# Patient Record
Sex: FEMALE | Race: WHITE | NOT HISPANIC OR LATINO | URBAN - METROPOLITAN AREA
[De-identification: names, ages, dates, MRNs, and addresses within clinical notes are randomized per-mention and may not be internally consistent; named-entity substitution may affect disease eponyms.]

---

## 2017-01-25 ENCOUNTER — INPATIENT (INPATIENT)
Facility: HOSPITAL | Age: 62
LOS: 1 days | Discharge: HOME | DRG: 192 | End: 2017-01-27
Attending: INTERNAL MEDICINE | Admitting: INTERNAL MEDICINE
Payer: COMMERCIAL

## 2017-01-25 VITALS
HEART RATE: 85 BPM | TEMPERATURE: 98 F | DIASTOLIC BLOOD PRESSURE: 71 MMHG | RESPIRATION RATE: 19 BRPM | SYSTOLIC BLOOD PRESSURE: 147 MMHG | OXYGEN SATURATION: 94 %

## 2017-01-25 DIAGNOSIS — J18.9 PNEUMONIA, UNSPECIFIED ORGANISM: ICD-10-CM

## 2017-01-25 DIAGNOSIS — J44.9 CHRONIC OBSTRUCTIVE PULMONARY DISEASE, UNSPECIFIED: ICD-10-CM

## 2017-01-25 LAB
ALBUMIN SERPL ELPH-MCNC: 4.3 G/DL — SIGNIFICANT CHANGE UP (ref 3.3–5)
ALP SERPL-CCNC: 119 U/L — SIGNIFICANT CHANGE UP (ref 40–120)
ALT FLD-CCNC: 41 U/L RC — SIGNIFICANT CHANGE UP (ref 10–45)
ANION GAP SERPL CALC-SCNC: 19 MMOL/L — HIGH (ref 5–17)
APPEARANCE UR: CLEAR — SIGNIFICANT CHANGE UP
AST SERPL-CCNC: 36 U/L — SIGNIFICANT CHANGE UP (ref 10–40)
BASE EXCESS BLDV CALC-SCNC: 2.2 MMOL/L — HIGH (ref -2–2)
BASOPHILS # BLD AUTO: 0 K/UL — SIGNIFICANT CHANGE UP (ref 0–0.2)
BASOPHILS NFR BLD AUTO: 0.3 % — SIGNIFICANT CHANGE UP (ref 0–2)
BILIRUB SERPL-MCNC: 0.5 MG/DL — SIGNIFICANT CHANGE UP (ref 0.2–1.2)
BILIRUB UR-MCNC: NEGATIVE — SIGNIFICANT CHANGE UP
BUN SERPL-MCNC: 7 MG/DL — SIGNIFICANT CHANGE UP (ref 7–23)
CA-I SERPL-SCNC: 1.15 MMOL/L — SIGNIFICANT CHANGE UP (ref 1.12–1.3)
CALCIUM SERPL-MCNC: 9.3 MG/DL — SIGNIFICANT CHANGE UP (ref 8.4–10.5)
CHLORIDE BLDV-SCNC: 105 MMOL/L — SIGNIFICANT CHANGE UP (ref 96–108)
CHLORIDE SERPL-SCNC: 98 MMOL/L — SIGNIFICANT CHANGE UP (ref 96–108)
CO2 BLDV-SCNC: 28 MMOL/L — SIGNIFICANT CHANGE UP (ref 22–30)
CO2 SERPL-SCNC: 21 MMOL/L — LOW (ref 22–31)
COLOR SPEC: SIGNIFICANT CHANGE UP
CREAT SERPL-MCNC: 0.62 MG/DL — SIGNIFICANT CHANGE UP (ref 0.5–1.3)
D DIMER BLD IA.RAPID-MCNC: 150 NG/ML DDU — SIGNIFICANT CHANGE UP
DIFF PNL FLD: NEGATIVE — SIGNIFICANT CHANGE UP
EOSINOPHIL # BLD AUTO: 0.1 K/UL — SIGNIFICANT CHANGE UP (ref 0–0.5)
EOSINOPHIL NFR BLD AUTO: 1.1 % — SIGNIFICANT CHANGE UP (ref 0–6)
GAS PNL BLDV: 136 MMOL/L — SIGNIFICANT CHANGE UP (ref 136–145)
GAS PNL BLDV: SIGNIFICANT CHANGE UP
GAS PNL BLDV: SIGNIFICANT CHANGE UP
GLUCOSE BLDV-MCNC: 138 MG/DL — HIGH (ref 70–99)
GLUCOSE SERPL-MCNC: 134 MG/DL — HIGH (ref 70–99)
GLUCOSE UR QL: NEGATIVE — SIGNIFICANT CHANGE UP
HCO3 BLDV-SCNC: 26 MMOL/L — SIGNIFICANT CHANGE UP (ref 21–29)
HCOV OC43 RNA SPEC QL NAA+PROBE: DETECTED
HCT VFR BLD CALC: 44.8 % — SIGNIFICANT CHANGE UP (ref 34.5–45)
HCT VFR BLDA CALC: 47 % — SIGNIFICANT CHANGE UP (ref 39–50)
HGB BLD CALC-MCNC: 15.5 G/DL — SIGNIFICANT CHANGE UP (ref 11.5–15.5)
HGB BLD-MCNC: 15.4 G/DL — SIGNIFICANT CHANGE UP (ref 11.5–15.5)
HMPV RNA SPEC QL NAA+PROBE: DETECTED
KETONES UR-MCNC: NEGATIVE — SIGNIFICANT CHANGE UP
LACTATE BLDV-MCNC: 1 MMOL/L — SIGNIFICANT CHANGE UP (ref 0.7–2)
LEUKOCYTE ESTERASE UR-ACNC: NEGATIVE — SIGNIFICANT CHANGE UP
LYMPHOCYTES # BLD AUTO: 1 K/UL — SIGNIFICANT CHANGE UP (ref 1–3.3)
LYMPHOCYTES # BLD AUTO: 13.5 % — SIGNIFICANT CHANGE UP (ref 13–44)
MCHC RBC-ENTMCNC: 31.5 PG — SIGNIFICANT CHANGE UP (ref 27–34)
MCHC RBC-ENTMCNC: 34.4 GM/DL — SIGNIFICANT CHANGE UP (ref 32–36)
MCV RBC AUTO: 91.5 FL — SIGNIFICANT CHANGE UP (ref 80–100)
MONOCYTES # BLD AUTO: 0.8 K/UL — SIGNIFICANT CHANGE UP (ref 0–0.9)
MONOCYTES NFR BLD AUTO: 10.3 % — SIGNIFICANT CHANGE UP (ref 2–14)
NEUTROPHILS # BLD AUTO: 5.7 K/UL — SIGNIFICANT CHANGE UP (ref 1.8–7.4)
NEUTROPHILS NFR BLD AUTO: 74.9 % — SIGNIFICANT CHANGE UP (ref 43–77)
NITRITE UR-MCNC: NEGATIVE — SIGNIFICANT CHANGE UP
PCO2 BLDV: 42 MMHG — SIGNIFICANT CHANGE UP (ref 35–50)
PH BLDV: 7.42 — SIGNIFICANT CHANGE UP (ref 7.35–7.45)
PH UR: 6.5 — SIGNIFICANT CHANGE UP (ref 4.8–8)
PLATELET # BLD AUTO: 198 K/UL — SIGNIFICANT CHANGE UP (ref 150–400)
PO2 BLDV: 28 MMHG — SIGNIFICANT CHANGE UP (ref 25–45)
POTASSIUM BLDV-SCNC: 3.7 MMOL/L — SIGNIFICANT CHANGE UP (ref 3.5–5)
POTASSIUM SERPL-MCNC: 3.9 MMOL/L — SIGNIFICANT CHANGE UP (ref 3.5–5.3)
POTASSIUM SERPL-SCNC: 3.9 MMOL/L — SIGNIFICANT CHANGE UP (ref 3.5–5.3)
PROT SERPL-MCNC: 7.8 G/DL — SIGNIFICANT CHANGE UP (ref 6–8.3)
PROT UR-MCNC: NEGATIVE — SIGNIFICANT CHANGE UP
RAPID RVP RESULT: DETECTED
RBC # BLD: 4.9 M/UL — SIGNIFICANT CHANGE UP (ref 3.8–5.2)
RBC # FLD: 12.3 % — SIGNIFICANT CHANGE UP (ref 10.3–14.5)
SAO2 % BLDV: 47 % — LOW (ref 67–88)
SODIUM SERPL-SCNC: 138 MMOL/L — SIGNIFICANT CHANGE UP (ref 135–145)
SP GR SPEC: <1.005 — LOW (ref 1.01–1.02)
UROBILINOGEN FLD QL: NEGATIVE — SIGNIFICANT CHANGE UP
WBC # BLD: 7.6 K/UL — SIGNIFICANT CHANGE UP (ref 3.8–10.5)
WBC # FLD AUTO: 7.6 K/UL — SIGNIFICANT CHANGE UP (ref 3.8–10.5)

## 2017-01-25 PROCEDURE — 71020: CPT | Mod: 26

## 2017-01-25 PROCEDURE — 99285 EMERGENCY DEPT VISIT HI MDM: CPT

## 2017-01-25 RX ORDER — IPRATROPIUM/ALBUTEROL SULFATE 18-103MCG
3 AEROSOL WITH ADAPTER (GRAM) INHALATION EVERY 6 HOURS
Qty: 0 | Refills: 0 | Status: DISCONTINUED | OUTPATIENT
Start: 2017-01-25 | End: 2017-01-26

## 2017-01-25 RX ORDER — ENOXAPARIN SODIUM 100 MG/ML
40 INJECTION SUBCUTANEOUS DAILY
Qty: 0 | Refills: 0 | Status: DISCONTINUED | OUTPATIENT
Start: 2017-01-25 | End: 2017-01-26

## 2017-01-25 RX ORDER — IPRATROPIUM/ALBUTEROL SULFATE 18-103MCG
3 AEROSOL WITH ADAPTER (GRAM) INHALATION ONCE
Qty: 0 | Refills: 0 | Status: COMPLETED | OUTPATIENT
Start: 2017-01-25 | End: 2017-01-25

## 2017-01-25 RX ADMIN — Medication 3 MILLILITER(S): at 16:59

## 2017-01-25 RX ADMIN — Medication 125 MILLIGRAM(S): at 16:59

## 2017-01-25 NOTE — ED PROVIDER NOTE - MEDICAL DECISION MAKING DETAILS
Tom: 61 year old female with sob x 4 days, worsening, worse with exertion, no cp. + sick contacts. recent travel from [place]. history of copd. will get labs, cxr, duonebs, iv steroids, reassess. likely admit for copd exacerbation versus pna. will send d-dimer as patient is low risk only with travel.  Patient beccomes hypoxic with ambulation to 88%.

## 2017-01-25 NOTE — ED ADULT NURSE NOTE - OBJECTIVE STATEMENT
62 y/o F pt w/ pmhx COPD/emphysema, present to ED cough, sinus congestion and shortness of breath significantly worsening 4 days Pt reports that for the last 2 days SOB worsen with exertion, unable to walk 2 steps, breathing through pursed lips. She reports that she recently traveled to [place], as that is where she lives, which was two weeks ago. productive cough with greenish sputum. Pt went to urgent care today and she was walked with a pulse ox on, showing that she was desaturating to 86-87% on RA while walking and therefore was sent to the ER. She denies any fevers, chills, chest pain, leg swelling.

## 2017-01-25 NOTE — ED PROVIDER NOTE - CONSTITUTIONAL, MLM
normal... Well appearing, well nourished, awake, alert, oriented to person, place, time/situation and mild apparent distress secondary to shortness of breath

## 2017-01-25 NOTE — H&P ADULT. - HISTORY OF PRESENT ILLNESS
A 62 y/o F with  pmhx COPD/emphysema, presenting with cough, sinus congestion and shortness of breath significantly worsening for the last week. Pt reports that for the last 2 days she has been unable to walk even a few steps without becoming very short of breath, breathing through pursed lips. She reports that she recently traveled to [place], as that is where she lives, which was two weeks ago. States that her illness started approximately a week after she returned. She states she went to urgent care today and she was walked with a pulse ox on, showing that she was desaturating to 86-87% on RA while walking and therefore was sent to the ER. She denies any fevers, chills, chest pain, leg swelling. Reports that she has history of multifocal pneumonia in the past.

## 2017-01-25 NOTE — ED PROVIDER NOTE - OBJECTIVE STATEMENT
62 y/o F pmhx COPD/emphysema, presenting with cough, sinus congestion and shortness of breath significantly worsening for the last week. Pt reports that for the last 2 days she has been unable to walk even a few steps without becoming very short of breath, breathing through pursed lips. She reports that she recently traveled to [place], as that is where she lives, which was two weeks ago. States that her illness started approximately a week after she returned. She states she went to urgent care today and she was walked with a pulse ox on, showing that she was desaturating to 86-87% on RA while walking and therefore was sent to the ER. She denies any fevers, chills, chest pain, leg swelling. Reports that she has history of multifocal pneumonia in the past.

## 2017-01-25 NOTE — ED PROVIDER NOTE - ATTENDING CONTRIBUTION TO CARE
I have seen and evaluated this patient with the advance practice clinician.   I agree with the findings  unless other wise stated.  After my face to face bedside evaluation, I am [age] year old female with sob. PE: att exam: patient awake alert mild distress, tachypneic. LUNGS left sided rhonchi at base, + right base crackles. CARD RRR no m/r/g.  Abdomen soft NT ND no rebound no guarding no CVA tenderness. EXT no edema no calf tenderness CV 2+DP/PT bilaterally. neuro A&Ox3, no focal deficits.  skin warm and dry no rash

## 2017-01-26 RX ORDER — ALBUTEROL 90 UG/1
2.5 AEROSOL, METERED ORAL EVERY 6 HOURS
Qty: 0 | Refills: 0 | Status: DISCONTINUED | OUTPATIENT
Start: 2017-01-26 | End: 2017-01-26

## 2017-01-26 RX ORDER — TIOTROPIUM BROMIDE 18 UG/1
1 CAPSULE ORAL; RESPIRATORY (INHALATION) DAILY
Qty: 0 | Refills: 0 | Status: DISCONTINUED | OUTPATIENT
Start: 2017-01-26 | End: 2017-01-27

## 2017-01-26 RX ORDER — PANTOPRAZOLE SODIUM 20 MG/1
40 TABLET, DELAYED RELEASE ORAL
Qty: 0 | Refills: 0 | Status: DISCONTINUED | OUTPATIENT
Start: 2017-01-26 | End: 2017-01-26

## 2017-01-26 RX ORDER — IPRATROPIUM/ALBUTEROL SULFATE 18-103MCG
3 AEROSOL WITH ADAPTER (GRAM) INHALATION EVERY 6 HOURS
Qty: 0 | Refills: 0 | Status: DISCONTINUED | OUTPATIENT
Start: 2017-01-26 | End: 2017-01-27

## 2017-01-26 RX ADMIN — Medication 200 MILLIGRAM(S): at 14:50

## 2017-01-26 RX ADMIN — Medication 3 MILLILITER(S): at 18:05

## 2017-01-26 RX ADMIN — Medication 200 MILLIGRAM(S): at 23:10

## 2017-01-26 RX ADMIN — Medication 3 MILLILITER(S): at 23:13

## 2017-01-26 RX ADMIN — Medication 3 MILLILITER(S): at 13:32

## 2017-01-26 RX ADMIN — Medication 40 MILLIGRAM(S): at 01:10

## 2017-01-26 RX ADMIN — Medication 200 MILLIGRAM(S): at 18:00

## 2017-01-26 RX ADMIN — TIOTROPIUM BROMIDE 1 CAPSULE(S): 18 CAPSULE ORAL; RESPIRATORY (INHALATION) at 18:01

## 2017-01-26 RX ADMIN — Medication 40 MILLIGRAM(S): at 08:53

## 2017-01-26 RX ADMIN — Medication 20 MILLIGRAM(S): at 16:30

## 2017-01-26 RX ADMIN — Medication 3 MILLILITER(S): at 01:47

## 2017-01-26 RX ADMIN — Medication 20 MILLIGRAM(S): at 23:13

## 2017-01-27 VITALS
HEART RATE: 80 BPM | DIASTOLIC BLOOD PRESSURE: 71 MMHG | TEMPERATURE: 98 F | RESPIRATION RATE: 20 BRPM | OXYGEN SATURATION: 93 % | SYSTOLIC BLOOD PRESSURE: 134 MMHG

## 2017-01-27 PROCEDURE — 94640 AIRWAY INHALATION TREATMENT: CPT

## 2017-01-27 PROCEDURE — 83605 ASSAY OF LACTIC ACID: CPT

## 2017-01-27 PROCEDURE — 81003 URINALYSIS AUTO W/O SCOPE: CPT

## 2017-01-27 PROCEDURE — 87486 CHLMYD PNEUM DNA AMP PROBE: CPT

## 2017-01-27 PROCEDURE — 85014 HEMATOCRIT: CPT

## 2017-01-27 PROCEDURE — 87581 M.PNEUMON DNA AMP PROBE: CPT

## 2017-01-27 PROCEDURE — 82435 ASSAY OF BLOOD CHLORIDE: CPT

## 2017-01-27 PROCEDURE — 87798 DETECT AGENT NOS DNA AMP: CPT

## 2017-01-27 PROCEDURE — 82803 BLOOD GASES ANY COMBINATION: CPT

## 2017-01-27 PROCEDURE — 71046 X-RAY EXAM CHEST 2 VIEWS: CPT

## 2017-01-27 PROCEDURE — 85027 COMPLETE CBC AUTOMATED: CPT

## 2017-01-27 PROCEDURE — 96374 THER/PROPH/DIAG INJ IV PUSH: CPT

## 2017-01-27 PROCEDURE — 99285 EMERGENCY DEPT VISIT HI MDM: CPT | Mod: 25

## 2017-01-27 PROCEDURE — 82330 ASSAY OF CALCIUM: CPT

## 2017-01-27 PROCEDURE — 87633 RESP VIRUS 12-25 TARGETS: CPT

## 2017-01-27 PROCEDURE — 84295 ASSAY OF SERUM SODIUM: CPT

## 2017-01-27 PROCEDURE — 82947 ASSAY GLUCOSE BLOOD QUANT: CPT

## 2017-01-27 PROCEDURE — 80053 COMPREHEN METABOLIC PANEL: CPT

## 2017-01-27 PROCEDURE — 85379 FIBRIN DEGRADATION QUANT: CPT

## 2017-01-27 PROCEDURE — 84132 ASSAY OF SERUM POTASSIUM: CPT

## 2017-01-27 RX ORDER — ALBUTEROL 90 UG/1
2 AEROSOL, METERED ORAL EVERY 6 HOURS
Qty: 0 | Refills: 0 | Status: DISCONTINUED | OUTPATIENT
Start: 2017-01-27 | End: 2017-01-27

## 2017-01-27 RX ORDER — LEVOTHYROXINE SODIUM 125 MCG
1 TABLET ORAL
Qty: 0 | Refills: 0 | COMMUNITY

## 2017-01-27 RX ORDER — ALBUTEROL 90 UG/1
2 AEROSOL, METERED ORAL
Qty: 1 | Refills: 0 | OUTPATIENT
Start: 2017-01-27 | End: 2017-02-06

## 2017-01-27 RX ORDER — TIOTROPIUM BROMIDE 18 UG/1
1 CAPSULE ORAL; RESPIRATORY (INHALATION)
Qty: 30 | Refills: 0 | OUTPATIENT
Start: 2017-01-27 | End: 2017-02-26

## 2017-01-27 RX ORDER — BUDESONIDE AND FORMOTEROL FUMARATE DIHYDRATE 160; 4.5 UG/1; UG/1
2 AEROSOL RESPIRATORY (INHALATION)
Qty: 0 | Refills: 0 | COMMUNITY

## 2017-01-27 RX ADMIN — Medication 200 MILLIGRAM(S): at 05:48

## 2017-01-27 RX ADMIN — TIOTROPIUM BROMIDE 1 CAPSULE(S): 18 CAPSULE ORAL; RESPIRATORY (INHALATION) at 12:28

## 2017-01-27 RX ADMIN — Medication 3 MILLILITER(S): at 12:28

## 2017-01-27 RX ADMIN — Medication 20 MILLIGRAM(S): at 08:32

## 2017-01-27 RX ADMIN — Medication 200 MILLIGRAM(S): at 12:28

## 2017-01-27 RX ADMIN — Medication 20 MILLIGRAM(S): at 13:20

## 2017-01-27 RX ADMIN — Medication 3 MILLILITER(S): at 05:48

## 2017-01-27 NOTE — DISCHARGE NOTE ADULT - PATIENT PORTAL LINK FT
“You can access the [facility] Patient Portal, offered by [facility], by registering with the following website: [email or web address]”

## 2017-01-27 NOTE — DISCHARGE NOTE ADULT - CARE PLAN
Principal Discharge DX:	Chronic obstructive pulmonary disease, unspecified COPD type  Goal:	remain symptom free  Instructions for follow-up, activity and diet:	take prescribed medication; f/u with Dr Shepard 1/30 mon at 1:30  Secondary Diagnosis:	Hypothyroidism  Instructions for follow-up, activity and diet:	take prescribed medication; f/u with PCP

## 2017-01-27 NOTE — DISCHARGE NOTE ADULT - MEDICATION SUMMARY - MEDICATIONS TO TAKE
I will START or STAY ON the medications listed below when I get home from the hospital:    predniSONE 20 mg oral tablet  -- 2 tab(s) by mouth once a day  -- Indication: For antiinflammatory    tiotropium 18 mcg inhalation capsule  -- 1 cap(s) inhaled once a day  -- Indication: For breathing    albuterol 90 mcg/inh inhalation aerosol  -- 2 puff(s) inhaled every 6 hours, As needed, Shortness of Breath and/or Wheezing  -- Indication: For breathing    guaiFENesin 100 mg/5 mL oral liquid  -- 10 milliliter(s) by mouth every 6 hours  -- Indication: For Cough    levoFLOXacin 500 mg oral tablet  -- 1 tab(s) by mouth every 24 hours start 1/28  -- Indication: For antibiotic    levothyroxine 50 mcg (0.05 mg) oral tablet  -- 1 tab(s) by mouth once a day  -- Indication: For hypothyroidism    homatropine-HYDROcodone 1.5 mg-5 mg/5 mL oral syrup  -- 5 milliliter(s) by mouth once a day (at bedtime), As needed, Cough MDD:5 ml  -- Indication: For Cough

## 2017-01-27 NOTE — DISCHARGE NOTE ADULT - CARE PROVIDER_API CALL
deangelo salguero  [address] [place] [address] [place]  Phone: [phone]  Fax: (   )    -

## 2017-01-27 NOTE — DISCHARGE NOTE ADULT - HOSPITAL COURSE
62 y/o F with  pmhx COPD/emphysema, presenting with cough, sinus congestion and shortness of breath significantly worsening for the last week.    Admitting Dx: COPD exacerbation  pt with metapneumovirus  iv steroids/ nebs  seen by pulm  improved changed to po steroids w. outpt f/u

## 2017-01-27 NOTE — DISCHARGE NOTE ADULT - MEDICATION SUMMARY - MEDICATIONS TO STOP TAKING
I will STOP taking the medications listed below when I get home from the hospital:    Symbicort 80 mcg-4.5 mcg/inh inhalation aerosol  -- 2 puff(s) inhaled 2 times a day, As Needed

## 2017-01-27 NOTE — DISCHARGE NOTE ADULT - PLAN OF CARE
remain symptom free take prescribed medication; f/u with Dr Shepard 1/30 mon at 1:30 take prescribed medication; f/u with PCP

## 2017-01-27 NOTE — DISCHARGE NOTE ADULT - PROVIDER TOKENS
FREE:[LAST:[noemy],FIRST:[deangelo],PHONE:[phone]],FAX:[(   [phone]    -],ADDRESS:[[address] [place] [address]]]

## 2021-08-26 NOTE — PATIENT PROFILE ADULT. - PT NEEDS ASSIST
What Type Of Note Output Would You Prefer (Optional)?: Bullet Format
How Severe Is Your Skin Lesion?: mild
Has Your Skin Lesion Been Treated?: not been treated
Is This A New Presentation, Or A Follow-Up?: Mole
Additional History: Wants to make sure it is not cancerous
no

## 2022-10-20 NOTE — PATIENT PROFILE ADULT. - NS TRANSFER RESPONSE BELONGING
Daily Note     Today's date: 10/20/2022  Patient name: Duyen Mendez  : 1975  MRN: [id number]  Referring provider: Alysa Torres MD  Dx:   Encounter Diagnosis     ICD-10-CM    1  Left wrist pain  M25 532    2  De Quervain's disease (radial styloid tenosynovitis)  M65 4                   Subjective: "the side of the wrist is better, the muscle is sore"  Base of volar thenar      Objective: See treatment diary below      Assessment: Tolerated treatment well  Patient would benefit from continued OT;  Pain resolving along the first dorsal compartment  Pain of CMC and thenar remains, tender with deep palpation  Plan: Progress treatment as tolerated  Progress to PRE as pain allows     Precautions:  Universal    Access Code: [email or web address]  URL: [email or web address]  Date: 10/18/2022  Prepared by: Sorin Guy    Exercises  · Wrist Prayer Stretch at Table - 3 x daily - 7 x weekly - 1 sets - 10 reps - 10 hold  · Thumb PROM Composite Extension - 3 x daily - 7 x weekly - 1 sets - 10 reps          Date 10/18 10/20           Visit 1 2           Manuals  10'           IASTM  8' thenar, palm, radial wrist           Kinesiotaping  CMC scoop           Jt mobs  CMC distranct                        Neuro Re-Ed                                                                                                        Ther Ex 15'   23'           HEP Prayer, thumb E PROM, pain,taping Self distract CMC;  CMC taping  8'           PROM 1:1  Wrist and hand  8'           PROM wrist  Prayer  10x10           PRE isometric  Th PA  Th add  10x10 each                                                               Ther Activity                                       Gait Training                                       Modalities             MHP  Pre tx
yes